# Patient Record
Sex: FEMALE
[De-identification: names, ages, dates, MRNs, and addresses within clinical notes are randomized per-mention and may not be internally consistent; named-entity substitution may affect disease eponyms.]

---

## 2018-08-17 ENCOUNTER — HOSPITAL ENCOUNTER (OUTPATIENT)
Dept: HOSPITAL 88 - OR | Age: 38
Discharge: HOME | End: 2018-08-17
Attending: INTERNAL MEDICINE
Payer: COMMERCIAL

## 2018-08-17 DIAGNOSIS — K51.50: ICD-10-CM

## 2018-08-17 DIAGNOSIS — K44.9: ICD-10-CM

## 2018-08-17 DIAGNOSIS — K29.70: ICD-10-CM

## 2018-08-17 DIAGNOSIS — K59.00: ICD-10-CM

## 2018-08-17 DIAGNOSIS — K21.9: ICD-10-CM

## 2018-08-17 DIAGNOSIS — D50.9: ICD-10-CM

## 2018-08-17 DIAGNOSIS — K20.9: ICD-10-CM

## 2018-08-17 DIAGNOSIS — Z80.0: ICD-10-CM

## 2018-08-17 DIAGNOSIS — R03.0: ICD-10-CM

## 2018-08-17 DIAGNOSIS — K22.2: Primary | ICD-10-CM

## 2018-08-17 DIAGNOSIS — K64.8: ICD-10-CM

## 2018-08-17 LAB
BASOPHILS # BLD AUTO: 0.1 10*3/UL (ref 0–0.1)
BASOPHILS NFR BLD AUTO: 0.8 % (ref 0–1)
DEPRECATED APTT PLAS QN: 29.6 SECONDS (ref 23.8–35.5)
DEPRECATED INR PLAS: 1.16
DEPRECATED NEUTROPHILS # BLD AUTO: 5 10*3/UL (ref 2.1–6.9)
EOSINOPHIL # BLD AUTO: 0.2 10*3/UL (ref 0–0.4)
EOSINOPHIL NFR BLD AUTO: 2.3 % (ref 0–6)
ERYTHROCYTE [DISTWIDTH] IN CORD BLOOD: 24.1 % (ref 11.7–14.4)
HCT VFR BLD AUTO: 36.6 % (ref 34.2–44.1)
HGB BLD-MCNC: 9.6 G/DL (ref 12–16)
IRON SATN MFR SERPL: 4 % (ref 15–50)
IRON SERPL-MCNC: 24 UG/DL (ref 50–170)
LYMPHOCYTES # BLD: 2 10*3/UL (ref 1–3.2)
LYMPHOCYTES NFR BLD AUTO: 25.7 % (ref 18–39.1)
MCH RBC QN AUTO: 18.3 PG (ref 28–32)
MCHC RBC AUTO-ENTMCNC: 26.2 G/DL (ref 31–35)
MCV RBC AUTO: 69.8 FL (ref 81–99)
MONOCYTES # BLD AUTO: 0.6 10*3/UL (ref 0.2–0.8)
MONOCYTES NFR BLD AUTO: 7.1 % (ref 4.4–11.3)
NEUTS SEG NFR BLD AUTO: 63.8 % (ref 38.7–80)
PLATELET # BLD AUTO: 512 X10E3/UL (ref 140–360)
PROTHROMBIN TIME: 13.9 SECONDS (ref 11.9–14.5)
RBC # BLD AUTO: 5.24 X10E6/UL (ref 3.6–5.1)
TIBC SERPL-MCNC: 591 UG/DL (ref 261–478)
TRANSFERRIN SERPL-MCNC: 422 MG/DL (ref 180–382)

## 2018-08-17 PROCEDURE — 83540 ASSAY OF IRON: CPT

## 2018-08-17 PROCEDURE — 45380 COLONOSCOPY AND BIOPSY: CPT

## 2018-08-17 PROCEDURE — 45378 DIAGNOSTIC COLONOSCOPY: CPT

## 2018-08-17 PROCEDURE — 43450 DILATE ESOPHAGUS 1/MULT PASS: CPT

## 2018-08-17 PROCEDURE — 43239 EGD BIOPSY SINGLE/MULTIPLE: CPT

## 2018-08-17 PROCEDURE — 81025 URINE PREGNANCY TEST: CPT

## 2018-08-17 PROCEDURE — 85610 PROTHROMBIN TIME: CPT

## 2018-08-17 PROCEDURE — 84466 ASSAY OF TRANSFERRIN: CPT

## 2018-08-17 PROCEDURE — 85730 THROMBOPLASTIN TIME PARTIAL: CPT

## 2018-08-17 PROCEDURE — 36415 COLL VENOUS BLD VENIPUNCTURE: CPT

## 2018-08-17 PROCEDURE — 85025 COMPLETE CBC W/AUTO DIFF WBC: CPT

## 2018-08-17 PROCEDURE — 85045 AUTOMATED RETICULOCYTE COUNT: CPT

## 2018-08-17 NOTE — OPERATIVE REPORT
DATE OF PROCEDURE:  August 17, 2018



REFERRING PHYSICIAN:  Dr. Camila Joyce.



PROCEDURES PERFORMED

1. Esophagogastroduodenoscopy with biopsies and esophageal dilatation.

2. Colonoscopy with biopsies.



INDICATIONS FOR EGD:  Dysphagia, heartburn.



INDICATIONS FOR COLONOSCOPY:  Iron-deficiency anemia, history of bright red 

blood per rectum.



MEDICATIONS:  Patient was done under MAC, please see anesthesiologist's 

note.



PROCEDURE:  With the patient in left lateral decubitus position, flexible 

fiberoptic Olympus gastroscope was introduced into the esophagus under 

direct visualization without any difficulty.  There was some patchy 

erythema noted in distal esophagus.  A mild stricture was noted at the GE 

junction that was dilated to size 52-Kittitian Negrete.  The scope was then 

advanced with ease into stomach and mucosa overlying the antrum and the 

body revealed some patchy erythema and low-grade to moderate edema and 

biopsies were obtained, sent to stain for H. pylori.  The pylorus was of 

normal contour and shape, was intubated with ease, and the scope was 

advanced all the way to the second portion of the duodenum.  The scope was 

then withdrawn slowly and biopsies were obtained from the proximal second 

portion to rule out sprue.  The mucosa overlying the duodenal bulb appeared 

to be within normal limits.  The scope was then withdrawn back into the 

stomach and retroflexed and mucosa overlying the fundus and the cardia 

appeared to be within normal limits.  The scope was then straightened out.  

The stomach was decompressed.  The scope was subsequently withdrawn.  

Patient tolerated the procedure well.



IMPRESSION

1. Mild distal esophagitis.

2. Esophageal stricture at gastroesophageal junction, dilated to size 

52-Kittitian Negrete.

3. Gastritis, biopsied, biopsies sent to stain for Helicobacter pylori.

4. Rule out sprue.



PLAN:  Follow up histology.  Initiate Protonix 40 mg 1 p.o. q.a.m. a.c.



Patient was then turned around.  After adequate lubrication of the anal 

canal, a flexible fiberoptic Olympus colonoscope was inserted into the 

rectum with ease and advanced all the way to the cecum.  The scope was then 

withdrawn slowly and mucosa overlying the cecum, ascending colon, and 

transverse colon appeared to be within normal limits.  Mild patchy 

inflammatory changes were noted in the left colon.  Biopsies were obtained. 

 The scope was then retroflexed into the distal rectum and small internal 

hemorrhoids were noted, none of which was actively bleeding.  The scope was 

then straightened out and was subsequently withdrawn.  Patient tolerated 

the procedure well.



IMPRESSION

1. Mild patchy left-sided colitis.

2. Internal hemorrhoids, none actively bleeding.



PLAN:  Follow up histology.  Initiate high-fiber low-fat diet.  Start VSL#3 

one p.o. daily.  Patient will need a small bowel series to complete workup.









DD:  08/17/2018 13:43

DT:  08/17/2018 14:33

Job#:  [id number]



cc:DR. CAMILA JOYCE